# Patient Record
Sex: MALE | Race: WHITE | NOT HISPANIC OR LATINO | Employment: PART TIME | URBAN - METROPOLITAN AREA
[De-identification: names, ages, dates, MRNs, and addresses within clinical notes are randomized per-mention and may not be internally consistent; named-entity substitution may affect disease eponyms.]

---

## 2024-07-29 ENCOUNTER — TELEPHONE (OUTPATIENT)
Dept: PULMONOLOGY | Facility: CLINIC | Age: 24
End: 2024-07-29
Payer: MEDICAID

## 2024-07-29 NOTE — TELEPHONE ENCOUNTER
Spoke with patient and told him that we do not take his insurance here at [place].  Gave the patient the number to Medicaid line.

## 2024-07-29 NOTE — TELEPHONE ENCOUNTER
----- Message from Real Rubio sent at 7/29/2024 10:46 AM CDT -----  Regarding: appt access  PATIENT CALL    Margie from [facility] called to facilitate NP appt scheduling. States that the pt was told that he'd need a referral, even though he's already seen this provider at  in the past. I confirmed that it's standard protocol for NEW medicaid patients to have a referral on file. However, since the pt is already established w/ this provider they are hoping this can be overridden in this case. They are also worried bc there's no one else at  to refer him. Please call back at [phone] to assist further    #Note: pt has called 3 times this morning about the issue#

## 2024-08-06 ENCOUNTER — OCCUPATIONAL HEALTH (OUTPATIENT)
Dept: URGENT CARE | Facility: CLINIC | Age: 24
End: 2024-08-06

## 2024-08-06 VITALS
TEMPERATURE: 97 F | HEART RATE: 84 BPM | DIASTOLIC BLOOD PRESSURE: 68 MMHG | WEIGHT: 152 LBS | OXYGEN SATURATION: 99 % | RESPIRATION RATE: 20 BRPM | SYSTOLIC BLOOD PRESSURE: 100 MMHG

## 2024-08-06 NOTE — PROGRESS NOTES
Subjective:      Patient ID: Coleman Tejeda is a 23 y.o. male.    Vitals:  vitals were not taken for this visit.     Chief Complaint: No chief complaint on file.    22 y/o male presents to the clinic today for completion of a school physical.   Denies any new complaints.  Denies any change in family history, medical history, surgical history, allergies, and social history.   Denies any change in medication or diet.  Denies any new injuries or traumas.  Patient has been eating, going to the restroom, and drinking like normal. Denies numbness or tingling. Denies radiation of pain. Denies fever, chills, body aches, chest pain, shortness of breath, abdominal pain, N/V/D or rashes.       Constitution: Negative for activity change, chills and fever.   HENT:  Negative for ear pain and sore throat.    Neck: Negative for neck pain.   Cardiovascular:  Negative for chest pain.   Eyes:  Negative for eye pain.   Respiratory:  Positive for asthma. Negative for shortness of breath.    Gastrointestinal:  Negative for abdominal pain, nausea, vomiting and diarrhea.   Genitourinary:  Negative for dysuria.   Musculoskeletal:  Negative for pain and muscle ache.   Skin:  Negative for rash.   Allergic/Immunologic: Positive for asthma. Negative for environmental allergies and seasonal allergies.   Neurological:  Negative for dizziness and headaches.   Psychiatric/Behavioral:  Negative for nervous/anxious. The patient is not nervous/anxious.     Objective:     Physical Exam    Assessment:     No diagnosis found.    Plan:       There are no diagnoses linked to this encounter.